# Patient Record
Sex: FEMALE | URBAN - METROPOLITAN AREA
[De-identification: names, ages, dates, MRNs, and addresses within clinical notes are randomized per-mention and may not be internally consistent; named-entity substitution may affect disease eponyms.]

---

## 2021-12-16 ENCOUNTER — IMPORTED ENCOUNTER (OUTPATIENT)
Dept: URBAN - METROPOLITAN AREA CLINIC 31 | Facility: CLINIC | Age: 76
End: 2021-12-16

## 2021-12-16 PROBLEM — H40.013: Noted: 2021-12-16

## 2021-12-16 PROCEDURE — 92133 CPTRZD OPH DX IMG PST SGM ON: CPT

## 2021-12-16 PROCEDURE — 99204 OFFICE O/P NEW MOD 45 MIN: CPT

## 2021-12-16 NOTE — PATIENT DISCUSSION
1. Pain in around the eye OD. 2. Glaucoma suspect OU - Increased C/O R/O OS>OD  OCT Done today. Return for an appointment in 6 months for pressure check. VF 24-2. with Dr. Ellin Jeans.

## 2022-04-02 ASSESSMENT — VISUAL ACUITY
OS_SC: 20/25
OD_CC: 20/400
OS_CC: 20/400
OD_SC: 20/25

## 2022-04-02 ASSESSMENT — TONOMETRY
OS_IOP_MMHG: 15
OD_IOP_MMHG: 16

## 2022-06-23 ENCOUNTER — ESTABLISHED PATIENT (OUTPATIENT)
Dept: URBAN - METROPOLITAN AREA CLINIC 29 | Facility: CLINIC | Age: 77
End: 2022-06-23

## 2022-06-23 DIAGNOSIS — H35.371: ICD-10-CM

## 2022-06-23 DIAGNOSIS — H35.052: ICD-10-CM

## 2022-06-23 DIAGNOSIS — Z96.1: ICD-10-CM

## 2022-06-23 DIAGNOSIS — H40.013: ICD-10-CM

## 2022-06-23 PROCEDURE — 92012 INTRM OPH EXAM EST PATIENT: CPT

## 2022-06-23 PROCEDURE — 92083 EXTENDED VISUAL FIELD XM: CPT

## 2022-06-23 ASSESSMENT — VISUAL ACUITY
OD_CC: 20/30
OS_CC: 20/25-1

## 2022-06-23 ASSESSMENT — TONOMETRY
OD_IOP_MMHG: 12
OS_IOP_MMHG: 13

## 2022-06-23 NOTE — PATIENT DISCUSSION
1. Pain in around the eye OD. 2. Glaucoma suspect OU - Increased C/O R/O OS>OD  OCT Done today. Return for an appointment in 6 months for pressure check. VF 24-2. with Dr. Tamara Tran.

## 2022-09-13 ENCOUNTER — APPOINTMENT (RX ONLY)
Dept: URBAN - METROPOLITAN AREA CLINIC 328 | Facility: CLINIC | Age: 77
Setting detail: DERMATOLOGY
End: 2022-09-13

## 2022-09-13 DIAGNOSIS — L90.5 SCAR CONDITIONS AND FIBROSIS OF SKIN: ICD-10-CM | Status: INADEQUATELY CONTROLLED

## 2022-09-13 DIAGNOSIS — K13.0 DISEASES OF LIPS: ICD-10-CM | Status: INADEQUATELY CONTROLLED

## 2022-09-13 DIAGNOSIS — I78.8 OTHER DISEASES OF CAPILLARIES: ICD-10-CM | Status: INADEQUATELY CONTROLLED

## 2022-09-13 PROCEDURE — 99213 OFFICE O/P EST LOW 20 MIN: CPT

## 2022-09-13 PROCEDURE — ? PRESCRIPTION

## 2022-09-13 PROCEDURE — ? COUNSELING

## 2022-09-13 RX ORDER — KETOCONAZOLE 20 MG/G
CREAM TOPICAL BID
Qty: 30 | Refills: 3 | Status: ERX | COMMUNITY
Start: 2022-09-13

## 2022-09-13 RX ORDER — HYDROCORTISONE 25 MG/G
CREAM TOPICAL BID
Qty: 30 | Refills: 3 | Status: ERX | COMMUNITY
Start: 2022-09-13

## 2022-09-13 RX ADMIN — HYDROCORTISONE: 25 CREAM TOPICAL at 00:00

## 2022-09-13 RX ADMIN — KETOCONAZOLE: 20 CREAM TOPICAL at 00:00

## 2022-09-13 ASSESSMENT — LOCATION SIMPLE DESCRIPTION DERM: LOCATION SIMPLE: LEFT CHEEK

## 2022-09-13 ASSESSMENT — LOCATION DETAILED DESCRIPTION DERM
LOCATION DETAILED: LEFT SUPERIOR MEDIAL BUCCAL CHEEK
LOCATION DETAILED: LEFT INFERIOR MEDIAL MALAR CHEEK

## 2022-09-13 ASSESSMENT — LOCATION ZONE DERM: LOCATION ZONE: FACE

## 2022-09-13 NOTE — HPI: SKIN LESION
Is This A New Presentation, Or A Follow-Up?: Skin Lesion
Additional History: 2/2022 we did a shave removal for sebaceous hyperplasia\\nShe would like to discuss options due to gland still being there but she is worried about scarring

## 2022-09-13 NOTE — HPI: DRY SKIN
Is This A New Presentation Or A Follow-Up?: Dry Skin
Additional History: Lips dry using chapstick NOS

## 2023-06-14 ENCOUNTER — APPOINTMENT (RX ONLY)
Dept: URBAN - METROPOLITAN AREA CLINIC 334 | Facility: CLINIC | Age: 78
Setting detail: DERMATOLOGY
End: 2023-06-14

## 2023-06-14 DIAGNOSIS — L71.8 OTHER ROSACEA: ICD-10-CM

## 2023-06-14 PROCEDURE — ? PRESCRIPTION

## 2023-06-14 PROCEDURE — ? COUNSELING

## 2023-06-14 PROCEDURE — 99213 OFFICE O/P EST LOW 20 MIN: CPT

## 2023-06-14 RX ORDER — HYDROCORTISONE 25 MG/G
CREAM TOPICAL BID
Qty: 30 | Refills: 3 | Status: ERX

## 2023-06-14 ASSESSMENT — LOCATION ZONE DERM: LOCATION ZONE: FACE

## 2023-06-14 ASSESSMENT — LOCATION SIMPLE DESCRIPTION DERM: LOCATION SIMPLE: LEFT CHEEK

## 2023-06-14 ASSESSMENT — LOCATION DETAILED DESCRIPTION DERM: LOCATION DETAILED: LEFT CENTRAL MALAR CHEEK
